# Patient Record
Sex: MALE | Race: BLACK OR AFRICAN AMERICAN | Employment: FULL TIME | URBAN - METROPOLITAN AREA
[De-identification: names, ages, dates, MRNs, and addresses within clinical notes are randomized per-mention and may not be internally consistent; named-entity substitution may affect disease eponyms.]

---

## 2020-07-27 ENCOUNTER — HOSPITAL ENCOUNTER (EMERGENCY)
Facility: CLINIC | Age: 30
Discharge: HOME | End: 2020-07-27
Attending: EMERGENCY MEDICINE
Payer: COMMERCIAL

## 2020-07-27 ENCOUNTER — APPOINTMENT (OUTPATIENT)
Dept: GENERAL RADIOLOGY | Facility: CLINIC | Age: 30
End: 2020-07-27
Payer: COMMERCIAL

## 2020-07-27 VITALS
TEMPERATURE: 98.9 F | WEIGHT: 150 LBS | BODY MASS INDEX: 22.22 KG/M2 | SYSTOLIC BLOOD PRESSURE: 121 MMHG | DIASTOLIC BLOOD PRESSURE: 60 MMHG | HEIGHT: 69 IN | HEART RATE: 54 BPM | OXYGEN SATURATION: 99 % | RESPIRATION RATE: 20 BRPM

## 2020-07-27 PROCEDURE — 99284 EMERGENCY DEPT VISIT MOD MDM: CPT

## 2020-07-27 PROCEDURE — 74022 RADEX COMPL AQT ABD SERIES: CPT

## 2020-07-27 ASSESSMENT — PAIN DESCRIPTION - ORIENTATION: ORIENTATION: LEFT;LOWER

## 2020-07-27 ASSESSMENT — PAIN DESCRIPTION - LOCATION: LOCATION: ABDOMEN

## 2020-07-27 ASSESSMENT — PAIN SCALES - GENERAL: PAINLEVEL_OUTOF10: 7

## 2020-07-27 ASSESSMENT — PAIN DESCRIPTION - PAIN TYPE: TYPE: ACUTE PAIN

## 2020-07-28 NOTE — ED PROVIDER NOTES
eMERGENCY dEPARTMENT eNCOUnter      Pt Name: Whitley Martinez  MRN: [id number]  [place] 1990  Date of evaluation: 7/27/2020      CHIEF COMPLAINT       Chief Complaint   Patient presents with    Abdominal Pain     left lower         HISTORY OF PRESENT ILLNESS    Whitley Martinez is a 34 y.o. male who presents with abdominal pain. Patient states he had some abdominal pain earlier left upper quadrant, not lower, as documented above. Says it just sudden crampy status much better at this time, still mildly present. No nausea no vomiting. No exacerbating relieving factors being a rash for years         REVIEW OF SYSTEMS       Oz abdominal pain. Negative for nausea, vomiting, chest pain, shortness breath, fever, chills     PAST MEDICAL HISTORY    has a past medical history of Herpes. SURGICAL HISTORY      has a past surgical history that includes hernia repair. CURRENT MEDICATIONS       Previous Medications    No medications on file       ALLERGIES     has No Known Allergies. FAMILY HISTORY     has no family status information on file. family history is not on file. SOCIAL HISTORY      reports that he has never smoked. He has never used smokeless tobacco. He reports current alcohol use. He reports current drug use. Drug: Marijuana. PHYSICAL EXAM     INITIAL VITALS:  height is 5' 9\" (1.753 m) and weight is 68 kg (150 lb). His oral temperature is 98.9 °F (37.2 °C). His blood pressure is 121/60 and his pulse is 54. His respiration is 20 and oxygen saturation is 99%. Gen.: Patient nontoxic, sprain, young male in no apparent distress. HEENT: Head is atraumatic. Respiratory: Lung sounds are clear bilateral.  Cardiac: Heart is regular rate and rhythm. GI: Abdomen soft, nontender palpation throughout with good active bowel sounds, nonsurgical exam.  Skin. Patient is diffuse, slightly patchy, dry rash mostly to his chest, antecubital fossa on the left side. No erythema.   No pustulant's    DIFFERENTIAL DIAGNOSIS/ MDM:     Rash, abdominal pain    DIAGNOSTIC RESULTS         RADIOLOGY:   I directly visualized the following  images and reviewed the radiologist interpretations:  XR ACUTE ABD SERIES CHEST 1 VW   Preliminary Result   Unremarkable exam.  No acute abnormalities. LABS:  Labs Reviewed - No data to display      EMERGENCY DEPARTMENT COURSE:   Vitals:    Vitals:    07/27/20 2110   BP: 121/60   Pulse: 54   Resp: 20   Temp: 98.9 °F (37.2 °C)   TempSrc: Oral   SpO2: 99%   Weight: 68 kg (150 lb)   Height: 5' 9\" (1.753 m)     -------------------------  BP: 121/60, Temp: 98.9 °F (37.2 °C), Pulse: 54, Resp: 20    No orders of the defined types were placed in this encounter. Re-evaluation Notes    Patient reevaluated. Abdomen remains benign. Says pain is basically gone. No indication of acute surgical abdomen. We will be discharged with early follow-up and return if worse        FINAL IMPRESSION      1. Left upper quadrant pain          DISPOSITION/PLAN   DISPOSITION Decision To Discharge 07/27/2020 10:05:33 PM      Condition on Disposition    Stable    PATIENT REFERRED TO:  No follow-up provider specified. DISCHARGE MEDICATIONS:  New Prescriptions    No medications on file       (Please note that portions of this note were completed with a voice recognition program.  Efforts were made to edit the dictations but occasionally words are mis-transcribed.)    Saunders MD, F.A.C.E.P.   Attending Emergency Physician        Vivek Conrad MD  07/27/20 [id number]

## 2020-07-28 NOTE — ED NOTES
Pt presents to ED today complaining of right lower abdominal pain that started around 6pm. Patient denies fever, vomiting, and diarrhea but states at one point his mouth \"got watery\". Patient declined wheel chair and ambulated to exam room without difficulty. No s/s of distress. Phone number given to patient to find a PCP per his request. Call light in reach.        Jeanine Guadalupe RN  07/27/20 2138